# Patient Record
Sex: MALE | Race: WHITE | NOT HISPANIC OR LATINO | Employment: FULL TIME | URBAN - NONMETROPOLITAN AREA
[De-identification: names, ages, dates, MRNs, and addresses within clinical notes are randomized per-mention and may not be internally consistent; named-entity substitution may affect disease eponyms.]

---

## 2023-06-08 ENCOUNTER — OFFICE VISIT (OUTPATIENT)
Dept: CARDIOLOGY | Facility: CLINIC | Age: 63
End: 2023-06-08
Payer: COMMERCIAL

## 2023-06-08 VITALS
HEIGHT: 71 IN | DIASTOLIC BLOOD PRESSURE: 81 MMHG | HEART RATE: 85 BPM | WEIGHT: 210.2 LBS | SYSTOLIC BLOOD PRESSURE: 118 MMHG | BODY MASS INDEX: 29.43 KG/M2 | OXYGEN SATURATION: 100 %

## 2023-06-08 DIAGNOSIS — R07.9 CHEST PAIN, UNSPECIFIED TYPE: Primary | ICD-10-CM

## 2023-06-08 DIAGNOSIS — I48.20 ATRIAL FIBRILLATION, CHRONIC: ICD-10-CM

## 2023-06-08 DIAGNOSIS — R06.02 SHORTNESS OF BREATH: ICD-10-CM

## 2023-06-08 PROCEDURE — 99204 OFFICE O/P NEW MOD 45 MIN: CPT | Performed by: PHYSICIAN ASSISTANT

## 2023-06-08 RX ORDER — ASPIRIN 81 MG/1
1 TABLET, COATED ORAL DAILY
COMMUNITY
Start: 2023-04-24

## 2023-06-08 RX ORDER — METOPROLOL SUCCINATE 25 MG/1
25 TABLET, EXTENDED RELEASE ORAL DAILY
COMMUNITY
Start: 2023-05-22

## 2023-06-08 RX ORDER — OMEPRAZOLE 40 MG/1
40 CAPSULE, DELAYED RELEASE ORAL DAILY
COMMUNITY

## 2023-06-08 NOTE — PROGRESS NOTES
Problem list     Subjective   Duke Thakur is a 63 y.o. male     Chief Complaint   Patient presents with    Abnormal ECG     (TACHY)       HPI    Patient is a 63-year-old male who presents to the office for evaluation.  Patient was referred for new onset atrial fibrillation.    Patient has done well and recently went to his primary for routine checkup.  EKG demonstrated atrial fibrillation.  He was placed on metoprolol.  CHADS2 vascular score was 1 and he was placed on aspirin.  He is here today for follow-up.    He does well.  Occasionally, he might sense a slight pain in his chest but nothing of significance or least of concern to him.  It is mild and seems to be random.    His dyspnea is mild depending on activity level.  At baseline activity, he appears to do well.  No PND or orthopnea.    He occasionally might sense a flutter type sensation.  Otherwise, patient is doing well.    Current Outpatient Medications on File Prior to Visit   Medication Sig Dispense Refill    Aspirin Low Dose 81 MG EC tablet Take 1 tablet by mouth Daily.      metoprolol succinate XL (TOPROL-XL) 25 MG 24 hr tablet Take 1 tablet by mouth Daily. for heart      omeprazole (priLOSEC) 40 MG capsule Take 1 capsule by mouth Daily.       No current facility-administered medications on file prior to visit.       Patient has no known allergies.    Past Medical History:   Diagnosis Date    Atrial fibrillation, chronic 6/8/2023       Social History     Socioeconomic History    Marital status:    Tobacco Use    Smoking status: Former     Packs/day: 2.00     Years: 30.00     Pack years: 60.00     Types: Cigarettes    Smokeless tobacco: Current     Types: Snuff   Substance and Sexual Activity    Alcohol use: Not Currently     Comment: SOBER 25 YEARS    Drug use: Never    Sexual activity: Defer       History reviewed. No pertinent family history.    Review of Systems   Constitutional:  Negative for appetite change, chills and fever.   HENT:  "Negative.  Negative for drooling, ear discharge, postnasal drip, sinus pain and tinnitus.    Eyes:  Negative for pain, redness and itching.   Respiratory:  Positive for cough and shortness of breath. Negative for choking and wheezing.    Cardiovascular:  Positive for chest pain and palpitations. Negative for leg swelling.   Gastrointestinal:  Negative for blood in stool, constipation, diarrhea and nausea.   Genitourinary: Negative.  Negative for difficulty urinating.   Musculoskeletal:  Positive for back pain. Negative for neck pain.   Skin:  Negative for rash and wound.   Neurological:  Positive for numbness. Negative for dizziness and weakness.   Psychiatric/Behavioral:  Negative for sleep disturbance.      Objective   Vitals:    06/08/23 1352   BP: 118/81   Pulse: 85   SpO2: 100%   Weight: 95.3 kg (210 lb 3.2 oz)   Height: 180.3 cm (71\")      /81   Pulse 85   Ht 180.3 cm (71\")   Wt 95.3 kg (210 lb 3.2 oz)   SpO2 100%   BMI 29.32 kg/m²     Lab Results (most recent)       None            Physical Exam  Vitals and nursing note reviewed.   Constitutional:       General: He is not in acute distress.     Appearance: Normal appearance. He is well-developed.   HENT:      Head: Normocephalic and atraumatic.   Eyes:      General: No scleral icterus.        Right eye: No discharge.         Left eye: No discharge.      Conjunctiva/sclera: Conjunctivae normal.   Neck:      Vascular: No carotid bruit.   Cardiovascular:      Rate and Rhythm: Normal rate. Rhythm irregularly irregular.      Heart sounds: Normal heart sounds. No murmur heard.    No friction rub. No gallop.   Pulmonary:      Effort: Pulmonary effort is normal. No respiratory distress.      Breath sounds: Normal breath sounds. No wheezing or rales.   Chest:      Chest wall: No tenderness.   Musculoskeletal:      Right lower leg: No edema.      Left lower leg: No edema.   Skin:     General: Skin is warm and dry.      Coloration: Skin is not pale.      " Findings: No erythema or rash.   Neurological:      Mental Status: He is alert and oriented to person, place, and time.      Cranial Nerves: No cranial nerve deficit.   Psychiatric:         Behavior: Behavior normal.       Procedure   Procedures       Assessment & Plan     Problems Addressed this Visit          Cardiac and Vasculature    Atrial fibrillation, chronic    Relevant Medications    metoprolol succinate XL (TOPROL-XL) 25 MG 24 hr tablet    Other Relevant Orders    Adult Transthoracic Echo Complete W/ Cont if Necessary Per Protocol    Stress Test With Myocardial Perfusion One Day    Chest pain - Primary    Relevant Orders    Adult Transthoracic Echo Complete W/ Cont if Necessary Per Protocol    Stress Test With Myocardial Perfusion One Day       Pulmonary and Pneumonias    Shortness of breath    Relevant Orders    Adult Transthoracic Echo Complete W/ Cont if Necessary Per Protocol    Stress Test With Myocardial Perfusion One Day     Diagnoses         Codes Comments    Chest pain, unspecified type    -  Primary ICD-10-CM: R07.9  ICD-9-CM: 786.50     Shortness of breath     ICD-10-CM: R06.02  ICD-9-CM: 786.05     Atrial fibrillation, chronic     ICD-10-CM: I48.20  ICD-9-CM: 427.31           Recommendation  1.  Patient is a 63-year-old male who presents for evaluation with new onset atrial fibrillation and appears to still be in A-fib on exam today.  I agree with metoprolol and he is on aspirin for stroke prophylaxis with CHADS2 vascular score of 1.  We discussed risk of stroke but he is doing well on this medicine.  For now, we will continue.    2.  Patient with chest pain and dyspnea with new onset A-fib.  We will add to schedule for testing.    3.  Stress test will be ordered for ischemia assessment.    4.  Echo to assess and evaluate LV systolic and diastolic performance, valvular structures etc.    5.  Otherwise, his symptoms are very minimal.  We will see him back for follow-up on above testing and  recommend further.  Follow-up with primary as scheduled.             Duke Thakur  reports that he has quit smoking. His smoking use included cigarettes. He has a 60.00 pack-year smoking history. His smokeless tobacco use includes snuff..                   Electronically signed by:

## 2023-07-25 ENCOUNTER — TELEPHONE (OUTPATIENT)
Dept: CARDIOLOGY | Facility: CLINIC | Age: 63
End: 2023-07-25
Payer: COMMERCIAL

## 2023-07-25 NOTE — TELEPHONE ENCOUNTER
Tried to contact patient no answer, was not able to leave .        ----- Message from JOSSE Kendall sent at 7/25/2023  4:06 PM EDT -----  1 week follow-up        Stress Test With Myocardial Perfusion One Day  Order: [id number]  Status: Final result       Visible to patient: No (inaccessible in MyChart)       Dx: Shortness of breath; Atrial fibrillat...    1 Result Note  Details    Reading Physician Reading Date Result Priority   Jose Alberto Dubon MD  [phone] 7/23/2023 Routine   Jose Alberto Dubon MD  [phone] 7/23/2023      Result Text  1.  Scintigraphy is compatible with anteroapical and apical ischemia.     2.  Moderately depressed post stress ejection fraction of 37% with global hypokinesis somewhat more pronounced in the inferior wall.        3.  No evidence of pharmacologically induced transient ischemic dilation or of increased lung uptake of radiopharmaceutical.

## 2023-07-27 NOTE — TELEPHONE ENCOUNTER
HUB transferred call. Patient was given stress test results. Appointment scheduled Wednesday 8/2 at 9:15. Patient will be able to make appointment.

## 2023-08-02 ENCOUNTER — TELEPHONE (OUTPATIENT)
Dept: CARDIOLOGY | Facility: CLINIC | Age: 63
End: 2023-08-02
Payer: COMMERCIAL

## 2023-08-02 ENCOUNTER — LAB (OUTPATIENT)
Dept: LAB | Facility: HOSPITAL | Age: 63
End: 2023-08-02
Payer: COMMERCIAL

## 2023-08-02 ENCOUNTER — OFFICE VISIT (OUTPATIENT)
Dept: CARDIOLOGY | Facility: CLINIC | Age: 63
End: 2023-08-02
Payer: COMMERCIAL

## 2023-08-02 VITALS
BODY MASS INDEX: 28.98 KG/M2 | OXYGEN SATURATION: 97 % | WEIGHT: 207 LBS | SYSTOLIC BLOOD PRESSURE: 123 MMHG | HEART RATE: 110 BPM | DIASTOLIC BLOOD PRESSURE: 87 MMHG | HEIGHT: 71 IN

## 2023-08-02 DIAGNOSIS — R06.02 SHORTNESS OF BREATH: ICD-10-CM

## 2023-08-02 DIAGNOSIS — I50.22 CHRONIC SYSTOLIC HEART FAILURE: ICD-10-CM

## 2023-08-02 DIAGNOSIS — I42.0 CARDIOMYOPATHY, DILATED: ICD-10-CM

## 2023-08-02 DIAGNOSIS — R94.39 ABNORMAL STRESS TEST: ICD-10-CM

## 2023-08-02 DIAGNOSIS — R06.02 SHORTNESS OF BREATH: Primary | ICD-10-CM

## 2023-08-02 LAB
ALBUMIN SERPL-MCNC: 4.8 G/DL (ref 3.5–5.2)
ALBUMIN/GLOB SERPL: 1.7 G/DL
ALP SERPL-CCNC: 78 U/L (ref 39–117)
ALT SERPL W P-5'-P-CCNC: 19 U/L (ref 1–41)
ANION GAP SERPL CALCULATED.3IONS-SCNC: 12.6 MMOL/L (ref 5–15)
AST SERPL-CCNC: 21 U/L (ref 1–40)
BASOPHILS # BLD AUTO: 0.07 10*3/MM3 (ref 0–0.2)
BASOPHILS NFR BLD AUTO: 0.9 % (ref 0–1.5)
BILIRUB SERPL-MCNC: 0.9 MG/DL (ref 0–1.2)
BUN SERPL-MCNC: 11 MG/DL (ref 8–23)
BUN/CREAT SERPL: 11.3 (ref 7–25)
CALCIUM SPEC-SCNC: 9.9 MG/DL (ref 8.6–10.5)
CHLORIDE SERPL-SCNC: 102 MMOL/L (ref 98–107)
CO2 SERPL-SCNC: 22.4 MMOL/L (ref 22–29)
CREAT SERPL-MCNC: 0.97 MG/DL (ref 0.76–1.27)
DEPRECATED RDW RBC AUTO: 41.1 FL (ref 37–54)
EGFRCR SERPLBLD CKD-EPI 2021: 88.8 ML/MIN/1.73
EOSINOPHIL # BLD AUTO: 0.19 10*3/MM3 (ref 0–0.4)
EOSINOPHIL NFR BLD AUTO: 2.4 % (ref 0.3–6.2)
ERYTHROCYTE [DISTWIDTH] IN BLOOD BY AUTOMATED COUNT: 12.9 % (ref 12.3–15.4)
GLOBULIN UR ELPH-MCNC: 2.8 GM/DL
GLUCOSE SERPL-MCNC: 103 MG/DL (ref 65–99)
HCT VFR BLD AUTO: 42.3 % (ref 37.5–51)
HGB BLD-MCNC: 14 G/DL (ref 13–17.7)
IMM GRANULOCYTES # BLD AUTO: 0.03 10*3/MM3 (ref 0–0.05)
IMM GRANULOCYTES NFR BLD AUTO: 0.4 % (ref 0–0.5)
LYMPHOCYTES # BLD AUTO: 2.07 10*3/MM3 (ref 0.7–3.1)
LYMPHOCYTES NFR BLD AUTO: 26.1 % (ref 19.6–45.3)
MCH RBC QN AUTO: 29 PG (ref 26.6–33)
MCHC RBC AUTO-ENTMCNC: 33.1 G/DL (ref 31.5–35.7)
MCV RBC AUTO: 87.6 FL (ref 79–97)
MONOCYTES # BLD AUTO: 0.51 10*3/MM3 (ref 0.1–0.9)
MONOCYTES NFR BLD AUTO: 6.4 % (ref 5–12)
NEUTROPHILS NFR BLD AUTO: 5.05 10*3/MM3 (ref 1.7–7)
NEUTROPHILS NFR BLD AUTO: 63.8 % (ref 42.7–76)
NRBC BLD AUTO-RTO: 0 /100 WBC (ref 0–0.2)
PLATELET # BLD AUTO: 292 10*3/MM3 (ref 140–450)
PMV BLD AUTO: 9.4 FL (ref 6–12)
POTASSIUM SERPL-SCNC: 4.8 MMOL/L (ref 3.5–5.2)
PROT SERPL-MCNC: 7.6 G/DL (ref 6–8.5)
RBC # BLD AUTO: 4.83 10*6/MM3 (ref 4.14–5.8)
SODIUM SERPL-SCNC: 137 MMOL/L (ref 136–145)
WBC NRBC COR # BLD: 7.92 10*3/MM3 (ref 3.4–10.8)

## 2023-08-02 PROCEDURE — 80053 COMPREHEN METABOLIC PANEL: CPT | Performed by: PHYSICIAN ASSISTANT

## 2023-08-02 PROCEDURE — 85025 COMPLETE CBC W/AUTO DIFF WBC: CPT | Performed by: PHYSICIAN ASSISTANT

## 2023-08-02 PROCEDURE — 99214 OFFICE O/P EST MOD 30 MIN: CPT | Performed by: PHYSICIAN ASSISTANT

## 2023-08-02 RX ORDER — ATORVASTATIN CALCIUM 20 MG/1
20 TABLET, FILM COATED ORAL DAILY
Qty: 30 TABLET | Refills: 11 | Status: SHIPPED | OUTPATIENT
Start: 2023-08-02

## 2023-08-30 ENCOUNTER — OUTSIDE FACILITY SERVICE (OUTPATIENT)
Dept: CARDIOLOGY | Facility: CLINIC | Age: 63
End: 2023-08-30
Payer: COMMERCIAL

## 2023-08-31 ENCOUNTER — TELEPHONE (OUTPATIENT)
Dept: CARDIOLOGY | Facility: CLINIC | Age: 63
End: 2023-08-31
Payer: COMMERCIAL

## 2023-08-31 RX ORDER — METOPROLOL SUCCINATE 50 MG/1
50 TABLET, EXTENDED RELEASE ORAL DAILY
Qty: 30 TABLET | Refills: 11 | Status: SHIPPED | OUTPATIENT
Start: 2023-08-31

## 2023-08-31 RX ORDER — METOPROLOL SUCCINATE 50 MG/1
50 TABLET, EXTENDED RELEASE ORAL DAILY
Qty: 30 TABLET | Refills: 11 | Status: SHIPPED | OUTPATIENT
Start: 2023-08-31 | End: 2023-08-31 | Stop reason: SDUPTHER

## 2023-08-31 NOTE — TELEPHONE ENCOUNTER
Rich at [facility] called to notify Arthur Blanc PA-C that patient stayed in afib during cath with heart rate at 120-130. She states one of his medications were expensive and he was no longer taking it and was not prescribed a blood thinner.   Explained per chart patient has chronic afib and refused anticoagulant. I will discuss treatment with Arthur Blanc PA-C and contact patient.     Recommendations per Arthur Blanc PA-C...   Increase Metoprolol to 50 mg daily  Start Farxiga 10 mg daily  Start Eliquis 5 mg BID if patient is agreeable to anticoagulant    Called patient with recommendations and risk of stroke with not taking anticoagulant. He is agreeable to taking all medications as recommended. He is changing to [facility]. Advised I will call with 30 day free trail offer and mail coapy cards as he may qualify with having commercial insurance. If copay is still costly explained I can complete patient assistance forms and use samples when available. Stressed that he please call office before he goes without medications and monitor BP/HR, calling with any concerns. Appointment to be made sooner than scheduled and he will receive a call with date and time.  Karely Zambrano MA

## 2023-09-05 ENCOUNTER — PRIOR AUTHORIZATION (OUTPATIENT)
Dept: CARDIOLOGY | Facility: CLINIC | Age: 63
End: 2023-09-05
Payer: COMMERCIAL

## 2023-09-05 NOTE — TELEPHONE ENCOUNTER
Prior authorization request received for Farxiga. Authorization submitted through Cover My Meds. Status pending.      PA Case: [id number]  Status: Approved   Coverage Starts on: 9/5/2023   Coverage Ends on: 9/4/2024  Karely Zambrano MA

## 2023-09-06 ENCOUNTER — OFFICE VISIT (OUTPATIENT)
Dept: CARDIOLOGY | Facility: CLINIC | Age: 63
End: 2023-09-06
Payer: COMMERCIAL

## 2023-09-06 VITALS
OXYGEN SATURATION: 99 % | SYSTOLIC BLOOD PRESSURE: 125 MMHG | HEART RATE: 90 BPM | HEIGHT: 71 IN | WEIGHT: 212 LBS | DIASTOLIC BLOOD PRESSURE: 95 MMHG | BODY MASS INDEX: 29.68 KG/M2

## 2023-09-06 DIAGNOSIS — I48.20 ATRIAL FIBRILLATION, CHRONIC: ICD-10-CM

## 2023-09-06 DIAGNOSIS — I42.0 CARDIOMYOPATHY, DILATED: Primary | ICD-10-CM

## 2023-09-06 DIAGNOSIS — I50.22 CHRONIC SYSTOLIC HEART FAILURE: ICD-10-CM

## 2023-09-06 PROCEDURE — 99214 OFFICE O/P EST MOD 30 MIN: CPT | Performed by: PHYSICIAN ASSISTANT

## 2023-09-06 NOTE — LETTER
September 6, 2023       No Recipients    Patient: Duke Thakur   YOB: 1960   Date of Visit: 9/6/2023       Dear Marlene Daly MD    Duke Thakur was in my office today. Below is a copy of my note.    If you have questions, please do not hesitate to call me. I look forward to following Duke along with you.         Sincerely,        JOSSE Lombardo        CC:   No Recipients    Problem list     Subjective  Duke Thakur is a 63 y.o. male     Chief Complaint   Patient presents with   • Cath follow up     No stents placed   Problem list  1.  History of chest pain  1.1 stress test July 2023 with anteroapical and apical ischemia and post-rest EF of 37%  1.2 cardiac catheterization August 2023 with very minimal disease of the coronaries and almost normal coronaries noted with an EF near 40%  2.  Dilated cardiomyopathy  2.1 EF estimated at 37% by stress test at 40 to 45% by echocardiogram  2.2 EF estimated near 40% by ventriculography at catheterization August 2023  3.  Systolic heart failure  4.  Chronic atrial fibrillation  4.1 diagnosed by EKG at PCP office  4.2 patient currently on metoprolol for rate control and on aspirin.  Patient refuses anticoagulation despite the risk of stroke  5.  GERD    HPI    Patient is a 63-year-old male who presents back to the office for evaluation.  He has done well on adjusted medical therapy.  He actually describes improved energy.  He was placed on higher doses of beta-blocker therapy for rate control and now feels such improvement.  He has no complaints of chest pain with only mild dyspnea and fatigue.  He does not describe PND orthopnea.    He does not sense palpitations.  He does not describe any bleeding on anticoagulation.  He is stable otherwise.      Current Outpatient Medications on File Prior to Visit   Medication Sig Dispense Refill   • apixaban (ELIQUIS) 5 MG tablet tablet Take 1 tablet by mouth 2 (Two) Times a Day. 60 tablet 11   • atorvastatin  (LIPITOR) 20 MG tablet Take 1 tablet by mouth Daily. 30 tablet 11   • dapagliflozin Propanediol 10 MG tablet Take 10 mg by mouth Daily. 30 tablet 11   • metoprolol succinate XL (TOPROL-XL) 50 MG 24 hr tablet Take 1 tablet by mouth Daily. 30 tablet 11   • omeprazole (priLOSEC) 40 MG capsule Take 1 capsule by mouth Daily.     • sacubitril-valsartan (ENTRESTO) 24-26 MG tablet Take 1 tablet by mouth 2 (Two) Times a Day. 60 tablet 11   • [DISCONTINUED] Aspirin Low Dose 81 MG EC tablet Take 1 tablet by mouth Daily.       No current facility-administered medications on file prior to visit.       Patient has no known allergies.    Past Medical History:   Diagnosis Date   • Atrial fibrillation, chronic 6/8/2023   • Chronic systolic heart failure 8/2/2023       Social History     Socioeconomic History   • Marital status:    Tobacco Use   • Smoking status: Former     Packs/day: 2.00     Years: 30.00     Pack years: 60.00     Types: Cigarettes   • Smokeless tobacco: Current     Types: Snuff   Substance and Sexual Activity   • Alcohol use: Not Currently     Comment: SOBER 25 YEARS   • Drug use: Never   • Sexual activity: Defer       History reviewed. No pertinent family history.    Review of Systems   Constitutional:  Positive for fatigue.   HENT: Negative.     Eyes: Negative.  Negative for visual disturbance.   Respiratory:  Positive for shortness of breath. Negative for apnea, cough, chest tightness and wheezing.    Cardiovascular: Negative.  Negative for chest pain, palpitations and leg swelling.   Gastrointestinal: Negative.  Negative for blood in stool.   Endocrine: Negative.    Genitourinary: Negative.  Negative for hematuria.   Musculoskeletal: Negative.  Negative for gait problem, neck pain and neck stiffness.   Skin: Negative.  Negative for color change, rash and wound.   Allergic/Immunologic: Negative.    Neurological: Negative.  Negative for dizziness, syncope, weakness, light-headedness, numbness and  "headaches.   Hematological:  Bruises/bleeds easily.   Psychiatric/Behavioral:  Positive for sleep disturbance.      Objective  Vitals:    09/06/23 1106   BP: 125/95   BP Location: Left arm   Patient Position: Sitting   Cuff Size: Adult   Pulse: 90   SpO2: 99%   Weight: 96.2 kg (212 lb)   Height: 180.3 cm (71\")      /95 (BP Location: Left arm, Patient Position: Sitting, Cuff Size: Adult)   Pulse 90   Ht 180.3 cm (71\")   Wt 96.2 kg (212 lb)   SpO2 99%   BMI 29.57 kg/m²     Lab Results (most recent)       None            Physical Exam  Vitals and nursing note reviewed.   Constitutional:       General: He is not in acute distress.     Appearance: Normal appearance. He is well-developed.   HENT:      Head: Normocephalic and atraumatic.   Eyes:      General: No scleral icterus.        Right eye: No discharge.         Left eye: No discharge.      Conjunctiva/sclera: Conjunctivae normal.   Neck:      Vascular: No carotid bruit.   Cardiovascular:      Rate and Rhythm: Normal rate. Rhythm irregularly irregular.      Heart sounds: Normal heart sounds. No murmur heard.    No friction rub. No gallop.   Pulmonary:      Effort: Pulmonary effort is normal. No respiratory distress.      Breath sounds: Normal breath sounds. No wheezing or rales.   Chest:      Chest wall: No tenderness.   Musculoskeletal:      Right lower leg: No edema.      Left lower leg: No edema.   Skin:     General: Skin is warm and dry.      Coloration: Skin is not pale.      Findings: No erythema or rash.   Neurological:      Mental Status: He is alert and oriented to person, place, and time.      Cranial Nerves: No cranial nerve deficit.   Psychiatric:         Behavior: Behavior normal.       Procedure  Procedures       Assessment & Plan    Problems Addressed this Visit          Cardiac and Vasculature    Atrial fibrillation, chronic    Chronic systolic heart failure    Cardiomyopathy, dilated - Primary     Diagnoses         Codes Comments    " Cardiomyopathy, dilated    -  Primary ICD-10-CM: I42.0  ICD-9-CM: 425.4     Chronic systolic heart failure     ICD-10-CM: I50.22  ICD-9-CM: 428.22     Atrial fibrillation, chronic     ICD-10-CM: I48.20  ICD-9-CM: 427.31             Recommendation  1.  Patient is a 63-year-old male who presents back to the office for follow-up.  Patient has chronic A-fib doing well on rate control therapy and anticoagulation.  He has no complaints of bleeding on Eliquis.  For now, we can monitor.    2.  Patient with chronic systolic heart failure but doing well with no symptoms to suggest decompensation.  We will continue current medical treatment.    3.  Patient with dilated cardiomyopathy but doing well on medical regimen.  He is on beta-blocker therapy as well as Entresto and an SGLT2 inhibitor.  We will continue.  We can see him back for follow-up in 6 months to year or sooner as symptoms discussed.  Follow-up with primary as scheduled.       Patient did not bring med list or medicine bottles to appointment, med list has been reviewed and updated based on patient's knowledge of their meds.      Advance Care Planning   ACP discussion was declined by the patient. Patient does not have an advance directive, declines further assistance.      Electronically signed by:

## 2023-09-06 NOTE — PROGRESS NOTES
Problem list     Subjective   Duke Thakur is a 63 y.o. male     Chief Complaint   Patient presents with    Cath follow up     No stents placed   Problem list  1.  History of chest pain  1.1 stress test July 2023 with anteroapical and apical ischemia and post-rest EF of 37%  1.2 cardiac catheterization August 2023 with very minimal disease of the coronaries and almost normal coronaries noted with an EF near 40%  2.  Dilated cardiomyopathy  2.1 EF estimated at 37% by stress test at 40 to 45% by echocardiogram  2.2 EF estimated near 40% by ventriculography at catheterization August 2023  3.  Systolic heart failure  4.  Chronic atrial fibrillation  4.1 diagnosed by EKG at PCP office  4.2 patient currently on metoprolol for rate control and on aspirin.  Patient refuses anticoagulation despite the risk of stroke  5.  GERD    HPI    Patient is a 63-year-old male who presents back to the office for evaluation.  He has done well on adjusted medical therapy.  He actually describes improved energy.  He was placed on higher doses of beta-blocker therapy for rate control and now feels such improvement.  He has no complaints of chest pain with only mild dyspnea and fatigue.  He does not describe PND orthopnea.    He does not sense palpitations.  He does not describe any bleeding on anticoagulation.  He is stable otherwise.      Current Outpatient Medications on File Prior to Visit   Medication Sig Dispense Refill    apixaban (ELIQUIS) 5 MG tablet tablet Take 1 tablet by mouth 2 (Two) Times a Day. 60 tablet 11    atorvastatin (LIPITOR) 20 MG tablet Take 1 tablet by mouth Daily. 30 tablet 11    dapagliflozin Propanediol 10 MG tablet Take 10 mg by mouth Daily. 30 tablet 11    metoprolol succinate XL (TOPROL-XL) 50 MG 24 hr tablet Take 1 tablet by mouth Daily. 30 tablet 11    omeprazole (priLOSEC) 40 MG capsule Take 1 capsule by mouth Daily.      sacubitril-valsartan (ENTRESTO) 24-26 MG tablet Take 1 tablet by mouth 2 (Two) Times a  "Day. 60 tablet 11    [DISCONTINUED] Aspirin Low Dose 81 MG EC tablet Take 1 tablet by mouth Daily.       No current facility-administered medications on file prior to visit.       Patient has no known allergies.    Past Medical History:   Diagnosis Date    Atrial fibrillation, chronic 6/8/2023    Chronic systolic heart failure 8/2/2023       Social History     Socioeconomic History    Marital status:    Tobacco Use    Smoking status: Former     Packs/day: 2.00     Years: 30.00     Pack years: 60.00     Types: Cigarettes    Smokeless tobacco: Current     Types: Snuff   Substance and Sexual Activity    Alcohol use: Not Currently     Comment: SOBER 25 YEARS    Drug use: Never    Sexual activity: Defer       History reviewed. No pertinent family history.    Review of Systems   Constitutional:  Positive for fatigue.   HENT: Negative.     Eyes: Negative.  Negative for visual disturbance.   Respiratory:  Positive for shortness of breath. Negative for apnea, cough, chest tightness and wheezing.    Cardiovascular: Negative.  Negative for chest pain, palpitations and leg swelling.   Gastrointestinal: Negative.  Negative for blood in stool.   Endocrine: Negative.    Genitourinary: Negative.  Negative for hematuria.   Musculoskeletal: Negative.  Negative for gait problem, neck pain and neck stiffness.   Skin: Negative.  Negative for color change, rash and wound.   Allergic/Immunologic: Negative.    Neurological: Negative.  Negative for dizziness, syncope, weakness, light-headedness, numbness and headaches.   Hematological:  Bruises/bleeds easily.   Psychiatric/Behavioral:  Positive for sleep disturbance.      Objective   Vitals:    09/06/23 1106   BP: 125/95   BP Location: Left arm   Patient Position: Sitting   Cuff Size: Adult   Pulse: 90   SpO2: 99%   Weight: 96.2 kg (212 lb)   Height: 180.3 cm (71\")      /95 (BP Location: Left arm, Patient Position: Sitting, Cuff Size: Adult)   Pulse 90   Ht 180.3 cm (71\")   " Wt 96.2 kg (212 lb)   SpO2 99%   BMI 29.57 kg/m²     Lab Results (most recent)       None            Physical Exam  Vitals and nursing note reviewed.   Constitutional:       General: He is not in acute distress.     Appearance: Normal appearance. He is well-developed.   HENT:      Head: Normocephalic and atraumatic.   Eyes:      General: No scleral icterus.        Right eye: No discharge.         Left eye: No discharge.      Conjunctiva/sclera: Conjunctivae normal.   Neck:      Vascular: No carotid bruit.   Cardiovascular:      Rate and Rhythm: Normal rate. Rhythm irregularly irregular.      Heart sounds: Normal heart sounds. No murmur heard.    No friction rub. No gallop.   Pulmonary:      Effort: Pulmonary effort is normal. No respiratory distress.      Breath sounds: Normal breath sounds. No wheezing or rales.   Chest:      Chest wall: No tenderness.   Musculoskeletal:      Right lower leg: No edema.      Left lower leg: No edema.   Skin:     General: Skin is warm and dry.      Coloration: Skin is not pale.      Findings: No erythema or rash.   Neurological:      Mental Status: He is alert and oriented to person, place, and time.      Cranial Nerves: No cranial nerve deficit.   Psychiatric:         Behavior: Behavior normal.       Procedure   Procedures       Assessment & Plan     Problems Addressed this Visit          Cardiac and Vasculature    Atrial fibrillation, chronic    Chronic systolic heart failure    Cardiomyopathy, dilated - Primary     Diagnoses         Codes Comments    Cardiomyopathy, dilated    -  Primary ICD-10-CM: I42.0  ICD-9-CM: 425.4     Chronic systolic heart failure     ICD-10-CM: I50.22  ICD-9-CM: 428.22     Atrial fibrillation, chronic     ICD-10-CM: I48.20  ICD-9-CM: 427.31             Recommendation  1.  Patient is a 63-year-old male who presents back to the office for follow-up.  Patient has chronic A-fib doing well on rate control therapy and anticoagulation.  He has no complaints of  bleeding on Eliquis.  For now, we can monitor.    2.  Patient with chronic systolic heart failure but doing well with no symptoms to suggest decompensation.  We will continue current medical treatment.    3.  Patient with dilated cardiomyopathy but doing well on medical regimen.  He is on beta-blocker therapy as well as Entresto and an SGLT2 inhibitor.  We will continue.  We can see him back for follow-up in 6 months to year or sooner as symptoms discussed.  Follow-up with primary as scheduled.       Patient did not bring med list or medicine bottles to appointment, med list has been reviewed and updated based on patient's knowledge of their meds.      Advance Care Planning   ACP discussion was declined by the patient. Patient does not have an advance directive, declines further assistance.      Electronically signed by:

## 2024-01-29 NOTE — TELEPHONE ENCOUNTER
Patient called reporting he was unable to  Eliquis and Farxiga. He has one day left and needs samples. Adivsed I will put up samples to pickup downsatirs in the basement and call the [facility] to see what is the hold up on Eliquis and Farxiga. Farxiga was approved until 9/2024.     Called [facility], patient now has [facility].  Entresto requires a prior authorization. Farxiga denied as it as his insurance requires he try/fail Jardiance. Patient did  Eliquis on Friday for $40.     Per Arthur Blanc PA-C, change Farxiga to Jardiance 10 mg PO daily. Samples and copay cards set aside for patient. He will  downstairs in the basement tomorrow and bring in new insurance cards.

## 2024-01-30 ENCOUNTER — PRIOR AUTHORIZATION (OUTPATIENT)
Dept: CARDIOLOGY | Facility: CLINIC | Age: 64
End: 2024-01-30
Payer: COMMERCIAL

## 2024-01-30 NOTE — TELEPHONE ENCOUNTER
Prior authorization request received for Entresto. Records attached and submitted through Cover My Meds. Status pending.      Request Reference Number: [id number]. ENTRESTO TAB 24-26MG is approved through 01/30/2025.    Patient and pharmacy aware.

## 2024-03-21 ENCOUNTER — OFFICE VISIT (OUTPATIENT)
Dept: CARDIOLOGY | Facility: CLINIC | Age: 64
End: 2024-03-21
Payer: COMMERCIAL

## 2024-03-21 VITALS
WEIGHT: 208 LBS | HEIGHT: 71 IN | SYSTOLIC BLOOD PRESSURE: 114 MMHG | DIASTOLIC BLOOD PRESSURE: 78 MMHG | BODY MASS INDEX: 29.12 KG/M2

## 2024-03-21 DIAGNOSIS — I50.22 CHRONIC SYSTOLIC HEART FAILURE: ICD-10-CM

## 2024-03-21 DIAGNOSIS — I48.20 ATRIAL FIBRILLATION, CHRONIC: Primary | ICD-10-CM

## 2024-03-21 DIAGNOSIS — R07.9 CHEST PAIN, UNSPECIFIED TYPE: ICD-10-CM

## 2024-03-21 DIAGNOSIS — R42 DIZZINESS: ICD-10-CM

## 2024-03-21 DIAGNOSIS — I42.0 CARDIOMYOPATHY, DILATED: ICD-10-CM

## 2024-03-21 NOTE — PROGRESS NOTES
Problem list     Subjective   Duke Thakur is a 64 y.o. male     Chief Complaint   Patient presents with    Follow-up     6 months   Problem list  1.  History of chest pain  1.1 stress test July 2023 with anteroapical and apical ischemia and post-rest EF of 37%  1.2 cardiac catheterization August 2023 with very minimal disease of the coronaries and almost normal coronaries noted with an EF near 40%  2.  Dilated cardiomyopathy  2.1 EF estimated at 37% by stress test at 40 to 45% by echocardiogram  2.2 EF estimated near 40% by ventriculography at catheterization August 2023  3.  Systolic heart failure  4.  Chronic atrial fibrillation  4.1 diagnosed by EKG at PCP office  4.2 patient currently on Eliquis for anticoagulation  5.  GERD    HPI    Patient is a 64-year-old male who presents to the office for evaluation.  He has history of minimal coronary disease.  He has history of dilated cardiomyopathy and chronic A-fib.    He has done well.  He occasionally has a sharp pain near the left axillary region that is atypical.  It is not severe nor has it been progressing.  Patient has a degree of dyspnea that is mild to moderate when exerting.  It appears stable.  He does not describe progression.  No PND or orthopnea.    He does not describe significant palpitations.  He does complain of occasional orthostatic dizziness.  No complaints of bleeding on Eliquis.  He is stable otherwise.      Current Outpatient Medications on File Prior to Visit   Medication Sig Dispense Refill    apixaban (ELIQUIS) 5 MG tablet tablet Take 1 tablet by mouth 2 (Two) Times a Day. 28 tablet 0    atorvastatin (LIPITOR) 20 MG tablet Take 1 tablet by mouth Daily. 30 tablet 11    empagliflozin (JARDIANCE) 10 MG tablet tablet Take 1 tablet by mouth Daily. 14 tablet 0    metoprolol succinate XL (TOPROL-XL) 50 MG 24 hr tablet Take 1 tablet by mouth Daily. 30 tablet 11    omeprazole (priLOSEC) 40 MG capsule Take 1 capsule by mouth Daily.       sacubitril-valsartan (ENTRESTO) 24-26 MG tablet Take 1 tablet by mouth 2 (Two) Times a Day. 28 tablet 0     No current facility-administered medications on file prior to visit.       Patient has no known allergies.    Past Medical History:   Diagnosis Date    Atrial fibrillation, chronic 6/8/2023    Chronic systolic heart failure 8/2/2023       Social History     Socioeconomic History    Marital status:    Tobacco Use    Smoking status: Former     Current packs/day: 2.00     Average packs/day: 2.0 packs/day for 30.0 years (60.0 ttl pk-yrs)     Types: Cigarettes    Smokeless tobacco: Current     Types: Snuff   Substance and Sexual Activity    Alcohol use: Not Currently     Comment: SOBER 25 YEARS    Drug use: Never    Sexual activity: Defer       History reviewed. No pertinent family history.    Review of Systems   Constitutional: Negative.  Negative for activity change, appetite change, chills, fatigue and fever.   HENT: Negative.  Negative for congestion, sinus pressure and sinus pain.    Eyes: Negative.    Respiratory:  Positive for chest tightness and shortness of breath. Negative for apnea, cough and wheezing.    Cardiovascular:  Positive for chest pain. Negative for palpitations and leg swelling.   Gastrointestinal: Negative.  Negative for blood in stool.   Endocrine: Negative.  Negative for cold intolerance and heat intolerance.   Genitourinary: Negative.  Negative for hematuria.   Musculoskeletal:  Negative for gait problem.   Skin: Negative.  Negative for color change, rash and wound.   Allergic/Immunologic: Negative.  Negative for environmental allergies and food allergies.   Neurological:  Positive for dizziness. Negative for syncope, weakness, light-headedness, numbness and headaches.   Hematological:  Bruises/bleeds easily.   Psychiatric/Behavioral: Negative.  Negative for sleep disturbance.        Objective   Vitals:    03/21/24 0911   BP: 114/78   BP Location: Left arm   Patient Position:  "Sitting   Cuff Size: Adult   Weight: 94.3 kg (208 lb)   Height: 180.3 cm (71\")      /78 (BP Location: Left arm, Patient Position: Sitting, Cuff Size: Adult)   Ht 180.3 cm (71\")   Wt 94.3 kg (208 lb)   BMI 29.01 kg/m²     Lab Results (most recent)       None            Physical Exam  Vitals and nursing note reviewed.   Constitutional:       General: He is not in acute distress.     Appearance: Normal appearance. He is well-developed.   HENT:      Head: Normocephalic and atraumatic.   Eyes:      General: No scleral icterus.        Right eye: No discharge.         Left eye: No discharge.      Conjunctiva/sclera: Conjunctivae normal.   Neck:      Vascular: No carotid bruit.   Cardiovascular:      Rate and Rhythm: Normal rate and regular rhythm. Rhythm irregularly irregular.      Heart sounds: Normal heart sounds. No murmur heard.     No friction rub. No gallop.   Pulmonary:      Effort: Pulmonary effort is normal. No respiratory distress.      Breath sounds: Normal breath sounds. No wheezing or rales.   Chest:      Chest wall: No tenderness.   Musculoskeletal:      Right lower leg: No edema.      Left lower leg: No edema.   Skin:     General: Skin is warm and dry.      Coloration: Skin is not pale.      Findings: No erythema or rash.   Neurological:      Mental Status: He is alert and oriented to person, place, and time.      Cranial Nerves: No cranial nerve deficit.   Psychiatric:         Behavior: Behavior normal.         Procedure   Procedures       Assessment & Plan     Problems Addressed this Visit          Cardiac and Vasculature    Atrial fibrillation, chronic - Primary    Chest pain    Chronic systolic heart failure    Cardiomyopathy, dilated       Symptoms and Signs    Dizziness     Diagnoses         Codes Comments    Atrial fibrillation, chronic    -  Primary ICD-10-CM: I48.20  ICD-9-CM: 427.31     Cardiomyopathy, dilated     ICD-10-CM: I42.0  ICD-9-CM: 425.4     Chronic systolic heart failure     " ICD-10-CM: I50.22  ICD-9-CM: 428.22     Chest pain, unspecified type     ICD-10-CM: R07.9  ICD-9-CM: 786.50     Dizziness     ICD-10-CM: R42  ICD-9-CM: 780.4           Recommendation  1.  Patient is a 65-year-old male that presents to the office for evaluation with chronic A-fib.  He is doing well.  Patient does not describe any  bleeding on anticoagulation.  We will monitor for now.    2.  Patient with atypical chest pain.  Patient has minimal coronary disease.  Patient has dilated cardiomyopathy and chronic systolic heart failure.  He is on appropriate medical therapy.  We will continue at this time.  He appears euvolemic on examination.    3.  Patient occasionally orthostatic.  Patient describes managing it.  We discussed lifestyle modifications.  We would prefer to continue these medications because of his cardiomyopathy.  He is agreeable.  If symptoms worsen, want him to call the office.    4.  Otherwise, we will see him back for follow-up in 6 months or sooner if needed.  Follow-up with primary as scheduled.         Patient did not bring med list or medicine bottles to appointment, med list has been reviewed and updated based on patient's knowledge of their meds.      Electronically signed by:

## 2024-03-21 NOTE — LETTER
March 21, 2024       No Recipients    Patient: Duke Thakur   YOB: 1960   Date of Visit: 3/21/2024       Dear Marlene Daly MD    Duke Thakur was in my office today. Below is a copy of my note.    If you have questions, please do not hesitate to call me. I look forward to following Duke along with you.         Sincerely,        JOSSE Lombardo        CC:   No Recipients    Problem list     Subjective  Duke Thakur is a 64 y.o. male     Chief Complaint   Patient presents with   • Follow-up     6 months   Problem list  1.  History of chest pain  1.1 stress test July 2023 with anteroapical and apical ischemia and post-rest EF of 37%  1.2 cardiac catheterization August 2023 with very minimal disease of the coronaries and almost normal coronaries noted with an EF near 40%  2.  Dilated cardiomyopathy  2.1 EF estimated at 37% by stress test at 40 to 45% by echocardiogram  2.2 EF estimated near 40% by ventriculography at catheterization August 2023  3.  Systolic heart failure  4.  Chronic atrial fibrillation  4.1 diagnosed by EKG at PCP office  4.2 patient currently on Eliquis for anticoagulation  5.  GERD    HPI    Patient is a 64-year-old male who presents to the office for evaluation.  He has history of minimal coronary disease.  He has history of dilated cardiomyopathy and chronic A-fib.    He has done well.  He occasionally has a sharp pain near the left axillary region that is atypical.  It is not severe nor has it been progressing.  Patient has a degree of dyspnea that is mild to moderate when exerting.  It appears stable.  He does not describe progression.  No PND or orthopnea.    He does not describe significant palpitations.  He does complain of occasional orthostatic dizziness.  No complaints of bleeding on Eliquis.  He is stable otherwise.      Current Outpatient Medications on File Prior to Visit   Medication Sig Dispense Refill   • apixaban (ELIQUIS) 5 MG tablet tablet Take 1  tablet by mouth 2 (Two) Times a Day. 28 tablet 0   • atorvastatin (LIPITOR) 20 MG tablet Take 1 tablet by mouth Daily. 30 tablet 11   • empagliflozin (JARDIANCE) 10 MG tablet tablet Take 1 tablet by mouth Daily. 14 tablet 0   • metoprolol succinate XL (TOPROL-XL) 50 MG 24 hr tablet Take 1 tablet by mouth Daily. 30 tablet 11   • omeprazole (priLOSEC) 40 MG capsule Take 1 capsule by mouth Daily.     • sacubitril-valsartan (ENTRESTO) 24-26 MG tablet Take 1 tablet by mouth 2 (Two) Times a Day. 28 tablet 0     No current facility-administered medications on file prior to visit.       Patient has no known allergies.    Past Medical History:   Diagnosis Date   • Atrial fibrillation, chronic 6/8/2023   • Chronic systolic heart failure 8/2/2023       Social History     Socioeconomic History   • Marital status:    Tobacco Use   • Smoking status: Former     Current packs/day: 2.00     Average packs/day: 2.0 packs/day for 30.0 years (60.0 ttl pk-yrs)     Types: Cigarettes   • Smokeless tobacco: Current     Types: Snuff   Substance and Sexual Activity   • Alcohol use: Not Currently     Comment: SOBER 25 YEARS   • Drug use: Never   • Sexual activity: Defer       History reviewed. No pertinent family history.    Review of Systems   Constitutional: Negative.  Negative for activity change, appetite change, chills, fatigue and fever.   HENT: Negative.  Negative for congestion, sinus pressure and sinus pain.    Eyes: Negative.    Respiratory:  Positive for chest tightness and shortness of breath. Negative for apnea, cough and wheezing.    Cardiovascular:  Positive for chest pain. Negative for palpitations and leg swelling.   Gastrointestinal: Negative.  Negative for blood in stool.   Endocrine: Negative.  Negative for cold intolerance and heat intolerance.   Genitourinary: Negative.  Negative for hematuria.   Musculoskeletal:  Negative for gait problem.   Skin: Negative.  Negative for color change, rash and wound.  "  Allergic/Immunologic: Negative.  Negative for environmental allergies and food allergies.   Neurological:  Positive for dizziness. Negative for syncope, weakness, light-headedness, numbness and headaches.   Hematological:  Bruises/bleeds easily.   Psychiatric/Behavioral: Negative.  Negative for sleep disturbance.        Objective  Vitals:    03/21/24 0911   BP: 114/78   BP Location: Left arm   Patient Position: Sitting   Cuff Size: Adult   Weight: 94.3 kg (208 lb)   Height: 180.3 cm (71\")      /78 (BP Location: Left arm, Patient Position: Sitting, Cuff Size: Adult)   Ht 180.3 cm (71\")   Wt 94.3 kg (208 lb)   BMI 29.01 kg/m²     Lab Results (most recent)       None            Physical Exam  Vitals and nursing note reviewed.   Constitutional:       General: He is not in acute distress.     Appearance: Normal appearance. He is well-developed.   HENT:      Head: Normocephalic and atraumatic.   Eyes:      General: No scleral icterus.        Right eye: No discharge.         Left eye: No discharge.      Conjunctiva/sclera: Conjunctivae normal.   Neck:      Vascular: No carotid bruit.   Cardiovascular:      Rate and Rhythm: Normal rate and regular rhythm. Rhythm irregularly irregular.      Heart sounds: Normal heart sounds. No murmur heard.     No friction rub. No gallop.   Pulmonary:      Effort: Pulmonary effort is normal. No respiratory distress.      Breath sounds: Normal breath sounds. No wheezing or rales.   Chest:      Chest wall: No tenderness.   Musculoskeletal:      Right lower leg: No edema.      Left lower leg: No edema.   Skin:     General: Skin is warm and dry.      Coloration: Skin is not pale.      Findings: No erythema or rash.   Neurological:      Mental Status: He is alert and oriented to person, place, and time.      Cranial Nerves: No cranial nerve deficit.   Psychiatric:         Behavior: Behavior normal.         Procedure  Procedures       Assessment & Plan    Problems Addressed this Visit  "         Cardiac and Vasculature    Atrial fibrillation, chronic - Primary    Chest pain    Chronic systolic heart failure    Cardiomyopathy, dilated       Symptoms and Signs    Dizziness     Diagnoses         Codes Comments    Atrial fibrillation, chronic    -  Primary ICD-10-CM: I48.20  ICD-9-CM: 427.31     Cardiomyopathy, dilated     ICD-10-CM: I42.0  ICD-9-CM: 425.4     Chronic systolic heart failure     ICD-10-CM: I50.22  ICD-9-CM: 428.22     Chest pain, unspecified type     ICD-10-CM: R07.9  ICD-9-CM: 786.50     Dizziness     ICD-10-CM: R42  ICD-9-CM: 780.4           Recommendation  1.  Patient is a 65-year-old male that presents to the office for evaluation with chronic A-fib.  He is doing well.  Patient does not describe any  bleeding on anticoagulation.  We will monitor for now.    2.  Patient with atypical chest pain.  Patient has minimal coronary disease.  Patient has dilated cardiomyopathy and chronic systolic heart failure.  He is on appropriate medical therapy.  We will continue at this time.  He appears euvolemic on examination.    3.  Patient occasionally orthostatic.  Patient describes managing it.  We discussed lifestyle modifications.  We would prefer to continue these medications because of his cardiomyopathy.  He is agreeable.  If symptoms worsen, want him to call the office.    4.  Otherwise, we will see him back for follow-up in 6 months or sooner if needed.  Follow-up with primary as scheduled.         Patient did not bring med list or medicine bottles to appointment, med list has been reviewed and updated based on patient's knowledge of their meds.      Electronically signed by:

## 2024-04-30 RX ORDER — EMPAGLIFLOZIN 10 MG/1
10 TABLET, FILM COATED ORAL DAILY
Qty: 30 TABLET | Refills: 2 | Status: SHIPPED | OUTPATIENT
Start: 2024-04-30

## 2024-07-25 DIAGNOSIS — I42.0 CARDIOMYOPATHY, DILATED: Primary | ICD-10-CM

## 2024-07-25 DIAGNOSIS — R07.9 CHEST PAIN, UNSPECIFIED TYPE: ICD-10-CM

## 2024-07-25 DIAGNOSIS — I50.22 CHRONIC SYSTOLIC HEART FAILURE: ICD-10-CM

## 2024-07-25 NOTE — TELEPHONE ENCOUNTER
Name from pharmacy: JARDIANCE 10 MG TABLET 10 Tablet         Will file in chart as: Jardiance 10 MG tablet tablet    Sig: TAKE ONE TABLET BY MOUTH DAILY    Disp: 30 tablet    Refills: 2    Start: 7/25/2024    Class: Normal    Non-formulary    Last ordered: 2 months ago (4/30/2024) by JOSSE Kendall    Last refill: 6/26/2024    Rx #: [id number]    Antihyperglycemics Protocol Gsejrf2307/25/2024 04:04 PM   Protocol Details Lipid panel in past year    GFR >30ml/min in past year    Recent or future visit with authorizing provider       Name from pharmacy: ATORVASTATIN 20 MG TABLET 20 Tablet         Will file in chart as: atorvastatin (LIPITOR) 20 MG tablet    Sig: Take 1 tablet by mouth Daily.    Original sig: TAKE 1 TABLET BY MOUTH DAILY    Disp: 30 tablet    Refills: 10    Start: 7/25/2024    Class: Normal    Last ordered: 11 months ago (8/2/2023) by JOSSE Kendall    Last refill: 6/26/2024    Rx #: [id number]    HMG-CoA Reductase Inhibitors (Statins) Protocol Qpspal2107/25/2024 04:04 PM   Protocol Details Lipid panel in past 12 months    ALK Phos in past 12 months    ALT in past 12 months    AST in past 12 months    Recent or future visit with authorizing provider

## 2024-07-26 RX ORDER — ATORVASTATIN CALCIUM 20 MG/1
20 TABLET, FILM COATED ORAL DAILY
Qty: 90 TABLET | Refills: 1 | Status: SHIPPED | OUTPATIENT
Start: 2024-07-26

## 2024-07-26 RX ORDER — EMPAGLIFLOZIN 10 MG/1
10 TABLET, FILM COATED ORAL DAILY
Qty: 90 TABLET | Refills: 1 | Status: SHIPPED | OUTPATIENT
Start: 2024-07-26

## 2024-09-24 ENCOUNTER — OFFICE VISIT (OUTPATIENT)
Dept: CARDIOLOGY | Facility: CLINIC | Age: 64
End: 2024-09-24
Payer: COMMERCIAL

## 2024-09-24 VITALS
HEIGHT: 71 IN | OXYGEN SATURATION: 98 % | HEART RATE: 70 BPM | DIASTOLIC BLOOD PRESSURE: 78 MMHG | SYSTOLIC BLOOD PRESSURE: 114 MMHG | BODY MASS INDEX: 29.96 KG/M2 | WEIGHT: 214 LBS

## 2024-09-24 DIAGNOSIS — I42.0 CARDIOMYOPATHY, DILATED: Primary | ICD-10-CM

## 2024-09-24 DIAGNOSIS — I48.20 ATRIAL FIBRILLATION, CHRONIC: ICD-10-CM

## 2024-09-24 DIAGNOSIS — I50.22 CHRONIC SYSTOLIC HEART FAILURE: ICD-10-CM

## 2024-09-24 PROCEDURE — 99213 OFFICE O/P EST LOW 20 MIN: CPT | Performed by: PHYSICIAN ASSISTANT

## 2024-09-30 RX ORDER — APIXABAN 5 MG/1
5 TABLET, FILM COATED ORAL 2 TIMES DAILY
Qty: 60 TABLET | Refills: 11 | Status: SHIPPED | OUTPATIENT
Start: 2024-09-30

## 2024-09-30 RX ORDER — SACUBITRIL AND VALSARTAN 24; 26 MG/1; MG/1
1 TABLET, FILM COATED ORAL 2 TIMES DAILY
Qty: 60 TABLET | Refills: 11 | Status: SHIPPED | OUTPATIENT
Start: 2024-09-30

## 2025-01-29 DIAGNOSIS — I42.0 CARDIOMYOPATHY, DILATED: ICD-10-CM

## 2025-01-29 DIAGNOSIS — I50.22 CHRONIC SYSTOLIC HEART FAILURE: ICD-10-CM

## 2025-01-29 DIAGNOSIS — R07.9 CHEST PAIN, UNSPECIFIED TYPE: ICD-10-CM

## 2025-01-29 RX ORDER — ATORVASTATIN CALCIUM 20 MG/1
20 TABLET, FILM COATED ORAL DAILY
Qty: 30 TABLET | Refills: 1 | Status: SHIPPED | OUTPATIENT
Start: 2025-01-29 | End: 2025-01-30 | Stop reason: SDUPTHER

## 2025-01-29 RX ORDER — EMPAGLIFLOZIN 10 MG/1
10 TABLET, FILM COATED ORAL DAILY
Qty: 30 TABLET | Refills: 1 | Status: SHIPPED | OUTPATIENT
Start: 2025-01-29 | End: 2025-01-30 | Stop reason: SDUPTHER

## 2025-01-30 DIAGNOSIS — I42.0 CARDIOMYOPATHY, DILATED: ICD-10-CM

## 2025-01-30 DIAGNOSIS — R07.9 CHEST PAIN, UNSPECIFIED TYPE: ICD-10-CM

## 2025-01-30 DIAGNOSIS — I50.22 CHRONIC SYSTOLIC HEART FAILURE: ICD-10-CM

## 2025-01-30 NOTE — TELEPHONE ENCOUNTER
Caller: Duke Thakur    Relationship: Self    Best call back number: [phone]    Requested Prescriptions:   Requested Prescriptions     Pending Prescriptions Disp Refills    metoprolol succinate XL (TOPROL-XL) 50 MG 24 hr tablet 30 tablet 11     Sig: Take 1 tablet by mouth Daily.    atorvastatin (LIPITOR) 20 MG tablet 30 tablet 1     Sig: Take 1 tablet by mouth Daily.    apixaban (Eliquis) 5 MG tablet tablet 60 tablet 11     Sig: Take 1 tablet by mouth 2 (Two) Times a Day.    sacubitril-valsartan (Entresto) 24-26 MG tablet 60 tablet 11     Sig: Take 1 tablet by mouth 2 (Two) Times a Day.    empagliflozin (Jardiance) 10 MG tablet tablet 30 tablet 1     Sig: Take 1 tablet by mouth Daily.    omeprazole (priLOSEC) 40 MG capsule       Sig: Take 1 capsule by mouth Daily.        Pharmacy where request should be sent: [address] [phone] [facility] [phone] [facility]     Last office visit with prescribing clinician: 9/24/2024   Last telemedicine visit with prescribing clinician: Visit date not found   Next office visit with prescribing clinician: 3/26/2025       Does the patient have less than a 3 day supply:  [x] Yes  [] No    Would you like a call back once the refill request has been completed: [] Yes [x] No    If the office needs to give you a call back, can they leave a voicemail: [] Yes [x] No    Enoch Pacheco Rep   01/30/25 16:26 EST

## 2025-02-03 RX ORDER — OMEPRAZOLE 40 MG/1
40 CAPSULE, DELAYED RELEASE ORAL DAILY
Qty: 90 CAPSULE | Refills: 3 | Status: SHIPPED | OUTPATIENT
Start: 2025-02-03

## 2025-02-03 RX ORDER — SACUBITRIL AND VALSARTAN 24; 26 MG/1; MG/1
1 TABLET, FILM COATED ORAL 2 TIMES DAILY
Qty: 60 TABLET | Refills: 11 | Status: SHIPPED | OUTPATIENT
Start: 2025-02-03

## 2025-02-03 RX ORDER — METOPROLOL SUCCINATE 50 MG/1
50 TABLET, EXTENDED RELEASE ORAL DAILY
Qty: 30 TABLET | Refills: 11 | Status: SHIPPED | OUTPATIENT
Start: 2025-02-03

## 2025-02-03 RX ORDER — ATORVASTATIN CALCIUM 20 MG/1
20 TABLET, FILM COATED ORAL DAILY
Qty: 30 TABLET | Refills: 1 | Status: SHIPPED | OUTPATIENT
Start: 2025-02-03

## 2025-03-26 ENCOUNTER — OFFICE VISIT (OUTPATIENT)
Dept: CARDIOLOGY | Facility: CLINIC | Age: 65
End: 2025-03-26
Payer: COMMERCIAL

## 2025-03-26 VITALS
DIASTOLIC BLOOD PRESSURE: 87 MMHG | BODY MASS INDEX: 29.43 KG/M2 | WEIGHT: 211 LBS | HEART RATE: 54 BPM | SYSTOLIC BLOOD PRESSURE: 127 MMHG | OXYGEN SATURATION: 99 %

## 2025-03-26 DIAGNOSIS — I48.20 ATRIAL FIBRILLATION, CHRONIC: ICD-10-CM

## 2025-03-26 DIAGNOSIS — I50.22 CHRONIC SYSTOLIC HEART FAILURE: ICD-10-CM

## 2025-03-26 DIAGNOSIS — I42.0 CARDIOMYOPATHY, DILATED: Primary | ICD-10-CM

## 2025-03-26 NOTE — PROGRESS NOTES
Problem list     Subjective   Duke Thakur is a 65 y.o. male     Chief Complaint   Patient presents with    6 month follow up     Cardiomyopathy     Problem list  1.  History of chest pain  1.1 stress test July 2023 with anteroapical and apical ischemia and post-rest EF of 37%  1.2 cardiac catheterization August 2023 with very minimal disease of the coronaries and almost normal coronaries noted with an EF near 40%  2.  Dilated cardiomyopathy  2.1 EF estimated at 37% by stress test at 40 to 45% by echocardiogram  2.2 EF estimated near 40% by ventriculography at catheterization August 2023  3.  Systolic heart failure  4.  Chronic atrial fibrillation  4.1 diagnosed by EKG at PCP office  4.2 patient currently on Eliquis for anticoagulation  5.  GERD    HPI    Patient is a 65-year-old male presenting back to the office for routine cardiac assessment.  Patient has done remarkably well.  As above, patient has history of dilated cardiomyopathy and A-fib.    Patient has done remarkably well.  He does not complain of any chest pain or pressure at all.  No complaints of any shortness of breath.  No dyspnea.  No complaints of PND or orthopnea.    He does not describe palpitating nor does he complain of dysrhythmic symptoms.  He is stable otherwise.      Current Outpatient Medications on File Prior to Visit   Medication Sig Dispense Refill    apixaban (Eliquis) 5 MG tablet tablet Take 1 tablet by mouth 2 (Two) Times a Day. 60 tablet 11    atorvastatin (LIPITOR) 20 MG tablet Take 1 tablet by mouth Daily. 30 tablet 1    empagliflozin (Jardiance) 10 MG tablet tablet Take 1 tablet by mouth Daily. 30 tablet 1    metoprolol succinate XL (TOPROL-XL) 50 MG 24 hr tablet Take 1 tablet by mouth Daily. 30 tablet 11    omeprazole (priLOSEC) 40 MG capsule Take 1 capsule by mouth Daily. 90 capsule 3    sacubitril-valsartan (Entresto) 24-26 MG tablet Take 1 tablet by mouth 2 (Two) Times a Day. 60 tablet 11     No current facility-administered  medications on file prior to visit.       Patient has no known allergies.    Past Medical History:   Diagnosis Date    Atrial fibrillation, chronic 6/8/2023    Chronic systolic heart failure 8/2/2023       Social History     Socioeconomic History    Marital status:    Tobacco Use    Smoking status: Former     Current packs/day: 2.00     Average packs/day: 2.0 packs/day for 30.0 years (60.0 ttl pk-yrs)     Types: Cigarettes    Smokeless tobacco: Current     Types: Snuff   Substance and Sexual Activity    Alcohol use: Not Currently     Comment: SOBER 25 YEARS    Drug use: Never    Sexual activity: Defer       History reviewed. No pertinent family history.    Review of Systems   Constitutional:  Negative for activity change, appetite change, diaphoresis, fatigue and fever.   HENT: Negative.  Negative for congestion, sinus pressure and sinus pain.    Eyes: Negative.  Negative for visual disturbance.   Respiratory: Negative.  Negative for apnea, cough, chest tightness, shortness of breath and wheezing.    Cardiovascular: Negative.  Negative for chest pain, palpitations and leg swelling.   Gastrointestinal: Negative.  Negative for blood in stool.   Endocrine: Negative.  Negative for cold intolerance and heat intolerance.   Genitourinary: Negative.  Negative for hematuria.   Musculoskeletal: Negative.  Negative for gait problem.   Skin: Negative.  Negative for color change, rash and wound.   Allergic/Immunologic: Negative.  Negative for environmental allergies and food allergies.   Neurological:  Negative for dizziness, syncope, weakness, light-headedness, numbness and headaches.   Hematological: Negative.  Does not bruise/bleed easily.   Psychiatric/Behavioral: Negative.  Negative for sleep disturbance.        Objective   Vitals:    03/26/25 0849   BP: 127/87   BP Location: Right arm   Patient Position: Sitting   Cuff Size: Adult   Pulse: 54   SpO2: 99%   Weight: 95.7 kg (211 lb)      /87 (BP Location: Right  arm, Patient Position: Sitting, Cuff Size: Adult)   Pulse 54   Wt 95.7 kg (211 lb)   SpO2 99%   BMI 29.43 kg/m²     Lab Results (most recent)       None            Physical Exam    Procedure     ECG 12 Lead    Date/Time: 3/26/2025 8:52 AM  Performed by: Arthur Blanc PA    Authorized by: Arthur Blanc PA  Comparison: compared with previous ECG from 5/3/2023  Comparison to previous ECG: EKG demonstrates A-fib at 89 bpm with low voltage and no acute ST changes             Assessment & Plan     Problems Addressed this Visit          Cardiac and Vasculature    Atrial fibrillation, chronic    Chronic systolic heart failure    Cardiomyopathy, dilated - Primary     Diagnoses         Codes Comments      Cardiomyopathy, dilated    -  Primary ICD-10-CM: I42.0  ICD-9-CM: 425.4       Chronic systolic heart failure     ICD-10-CM: I50.22  ICD-9-CM: 428.22       Atrial fibrillation, chronic     ICD-10-CM: I48.20  ICD-9-CM: 427.31             Recommendations  1.  Patient is a 65-year-old male that presents to the office for evaluation.  Patient with dilated cardiomyopathy doing well on guideline directed medical therapy no symptoms to suggest decompensated failure.  For now, we can monitor.    2.  Patient with chronic A-fib doing well on rate control therapy with no complaints of bleeding on anticoagulation.  No changes at this time.    3.  We will see patient back for follow-up routinely in 6 months to year or sooner if needed.  Follow-up with primary as scheduled.           Duke Thakur  reports that he has quit smoking. His smoking use included cigarettes. He has a 60 pack-year smoking history. His smokeless tobacco use includes snuff.     Patient did not bring med list or medicine bottles to appointment, med list has been reviewed and updated based on patient's knowledge of their meds.    Electronically signed by:

## 2025-05-05 ENCOUNTER — TELEPHONE (OUTPATIENT)
Dept: CARDIOLOGY | Facility: CLINIC | Age: 65
End: 2025-05-05
Payer: COMMERCIAL

## 2025-05-05 NOTE — TELEPHONE ENCOUNTER
Received cardiac clearance request from DR KEI QUICK stating pt has EGD scheduled for 05/19/2025 and is requiring a cardiac clearance. Placed cardiac clearance request in KARIN'S inbox to review and address with provider.

## 2025-05-05 NOTE — LETTER
May 12, 2025           To Whom It May Concern:    We build our practice on integrity and patient care. The highest compliment we can receive is the referral of friends, family and patients to our office. We want to take this time to thank you for considering our group as part of your care team.    The medical records for Duke Thakur 1960 were reviewed for pre-operative clearance on 05/12/25. It has been determined that this patient has:  INCREASED BUT NOT PROHIBITIVE RISK DUE TO CHF. MONITOR BP,VOLUME STATUS AND TELEMETRY CLOSELY    Duke Thakur is currently on the following medications that will need to be held prior to surgery: CAN HOLD ELIQUIS 3 DAYS PRIOR.    This pre-operative evaluation has been completed based on patient reported medical conditions at the date of this letter, this evaluation may have considered in part results of additional testing, completion of an EKG and patient reported symptoms at the time of their visit.    Duke Thakur's pre-operative clearance is good for thirty(30) days from the date of this letter with no reported changes in health, symptoms or diagnosis from the patient, their primary care provider or your office.    Your office has reported the patient will under go FOR EGD on 05/19/25 with Dr. QUICK. If this appointment is changed or moved outside of thirty(30) days from 05/12/25 this pre-operative clearance is void.    If you have any further questions please give our office a call.    Thank you for your trust,      JOSSE Ruiz